# Patient Record
Sex: MALE | Race: WHITE
[De-identification: names, ages, dates, MRNs, and addresses within clinical notes are randomized per-mention and may not be internally consistent; named-entity substitution may affect disease eponyms.]

---

## 2021-04-12 ENCOUNTER — HOSPITAL ENCOUNTER (EMERGENCY)
Dept: HOSPITAL 41 - JD.ED | Age: 61
Discharge: HOME | End: 2021-04-12
Payer: MEDICARE

## 2021-04-12 DIAGNOSIS — N41.0: ICD-10-CM

## 2021-04-12 DIAGNOSIS — Z72.0: ICD-10-CM

## 2021-04-12 DIAGNOSIS — Z86.73: ICD-10-CM

## 2021-04-12 DIAGNOSIS — E11.65: Primary | ICD-10-CM

## 2021-04-12 DIAGNOSIS — N39.0: ICD-10-CM

## 2021-04-12 LAB — HBA1C BLD-MCNC: 7.6 %

## 2021-04-12 PROCEDURE — 83735 ASSAY OF MAGNESIUM: CPT

## 2021-04-12 PROCEDURE — 84484 ASSAY OF TROPONIN QUANT: CPT

## 2021-04-12 PROCEDURE — 93005 ELECTROCARDIOGRAM TRACING: CPT

## 2021-04-12 PROCEDURE — 85610 PROTHROMBIN TIME: CPT

## 2021-04-12 PROCEDURE — 87088 URINE BACTERIA CULTURE: CPT

## 2021-04-12 PROCEDURE — 80307 DRUG TEST PRSMV CHEM ANLYZR: CPT

## 2021-04-12 PROCEDURE — 87184 SC STD DISK METHOD PER PLATE: CPT

## 2021-04-12 PROCEDURE — 81001 URINALYSIS AUTO W/SCOPE: CPT

## 2021-04-12 PROCEDURE — 83605 ASSAY OF LACTIC ACID: CPT

## 2021-04-12 PROCEDURE — 83930 ASSAY OF BLOOD OSMOLALITY: CPT

## 2021-04-12 PROCEDURE — 83036 HEMOGLOBIN GLYCOSYLATED A1C: CPT

## 2021-04-12 PROCEDURE — 80306 DRUG TEST PRSMV INSTRMNT: CPT

## 2021-04-12 PROCEDURE — 86140 C-REACTIVE PROTEIN: CPT

## 2021-04-12 PROCEDURE — 99285 EMERGENCY DEPT VISIT HI MDM: CPT

## 2021-04-12 PROCEDURE — 87086 URINE CULTURE/COLONY COUNT: CPT

## 2021-04-12 PROCEDURE — 82962 GLUCOSE BLOOD TEST: CPT

## 2021-04-12 PROCEDURE — 36415 COLL VENOUS BLD VENIPUNCTURE: CPT

## 2021-04-12 PROCEDURE — 85025 COMPLETE CBC W/AUTO DIFF WBC: CPT

## 2021-04-12 PROCEDURE — 87186 SC STD MICRODIL/AGAR DIL: CPT

## 2021-04-12 PROCEDURE — 71045 X-RAY EXAM CHEST 1 VIEW: CPT

## 2021-04-12 PROCEDURE — 85730 THROMBOPLASTIN TIME PARTIAL: CPT

## 2021-04-12 PROCEDURE — 96365 THER/PROPH/DIAG IV INF INIT: CPT

## 2021-04-12 PROCEDURE — 83880 ASSAY OF NATRIURETIC PEPTIDE: CPT

## 2021-04-12 PROCEDURE — 70450 CT HEAD/BRAIN W/O DYE: CPT

## 2021-04-12 PROCEDURE — 80053 COMPREHEN METABOLIC PANEL: CPT

## 2021-04-12 NOTE — CT
Head CT

 

Technique: Multiple axial sections through the brain were obtained.  

Intravenous contrast was not utilized.  Reconstructed coronal and 

sagittal images were obtained.

 

Findings: Old infarct is seen within the left periventricular white 

matter.  This causes mild ex vacuole enlargement of the left lateral 

ventricle.  Ventricles along with basal cisterns and sulci over the 

convexities are also mildly dilated.  No other abnormal parenchymal 

densities are seen.  No evidence of intracranial hemorrhage is seen.  

No midline shift or mass-effect is seen.

 

Bone window settings were reviewed.  Visualized mastoid sinuses and 

paranasal sinuses show nothing acute.  No acute calvarial finding is 

appreciated.

 

Impression:

1.  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 

vacuole enlargement of the left lateral ventricle.

2.  Mild senescent change as noted above.

3.  No acute intracranial abnormality is appreciated.

 

Diagnostic code #2

## 2021-04-12 NOTE — EDM.PDOC
ED HPI GENERAL MEDICAL PROBLEM





- General


Chief Complaint: Neuro Symptoms/Deficits


Stated Complaint: CONFUSION


Time Seen by Provider: 04/12/21 15:00


Source of Information: Reports: Patient


History Limitations: Reports: No Limitations





- History of Present Illness


INITIAL COMMENTS - FREE TEXT/NARRATIVE: 


60-year-old male presents to the ED complaining of confusion and weakness parti

cular of his left side.  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 

hemiparesis and difficulties with his speech.  His presentation was that of 

being confused not able to identify where he gets his prescriptions what 

prescriptions he is on etc.  He states for the last week or more he is 

appreciated that if he bends over to tie his shoes he lifts towards the left 

side but he has not yet fallen.  He does have intermittent problems with 

headaches.  No nausea or vomiting.  No recent closed head injuries.  He denies 

alcohol use.  The only medication that is new to him is Metformin which is 

started 500 mg twice daily about a month ago and he is not checking blood sugars

at home.  Therefore we do cannot identify what medications he takes per se as he

gets them through the mail.  Patient was seen as a stroke alert patient through 

the ED.  He was sent to the CT suite by nursing staff.  CT head reveals an old 

infarct within the left periventricular white matter.  This causes mild ex vacuo

oral enlargement of the left lateral ventricle.  Ventricles along with the basal

cisterns and sulci over the convexities are also mildly dilated.  No other 

abnormal parenchymal densities are seen.  No evidence of intracranial hemorrhage

is seen.  No midline shift or mass-effect identified.  Bone window settings were

reviewed.  Visualized mastoid sinuses and paranasal sinuses show nothing acute. 

No acute calvarial finding is appreciated.  Of note patient quit smoking after 

his first CVA 7 years ago but has been smoking again for the last 7 months.  

Took a while to get this out of him but he is experiencing some dysuria urgency 

and frequency that started last evening.





Onset: Unknown/Unsure (Sounds like left-sided weakness and feeling off balance 

has been going on for at least a month but seems to be progressively getting 

worse.)


Duration: Week(s):, Getting Worse


Location: Reports: Generalized (Seems to be experiencing left-sided weakness  

listing to the left side at times and feels off balance.)


Quality: Reports: Other (Feeling confused and disoriented at times.)


Severity: Moderate


Improves with: Reports: None


Worsens with: Reports: None


Context: Denies: Activity, Exercise, Lifting, Sick Contact, Other


Associated Symptoms: Reports: Confusion, Cough, cough w sputum (Gross cough.), 

Headaches, Malaise, Shortness of Breath, Weakness.  Denies: No Other Symptoms, 

Chest Pain (  Occasional brown sputum production), Diaphoresis, Fever/Chills, 

Loss of Appetite, Nausea/Vomiting, Rash, Seizure, Syncope


Treatments PTA: Reports: Other (see below) (Treatment plan about a month ago 

only new medication is Metformin 500 mg twice daily introduced to his)


  ** Headache


Pain Score (Numeric/FACES): 3





- Related Data


                                    Allergies











Allergy/AdvReac Type Severity Reaction Status Date / Time


 


No Known Allergies Allergy   Verified 04/12/21 14:57











Home Meds: 


                                    Home Meds





levoFLOXacin [Levaquin] 500 mg PO DAILY #14 tab 04/12/21 [Rx]











Past Medical History


Cardiovascular History: Reports: Hypertension


Gastrointestinal History: Reports: GERD


Neurological History: Reports: CVA (Affecting the right side of his body with 

some expressive aphasia for which he is recovered from very well.  He still has 

some residual right-sided weakness and off balance symptoms at times)


Endocrine/Metabolic History: Reports: Diabetes, Type II





Social & Family History





- Tobacco Use


Tobacco Use Status *Q: Current Every Day Tobacco User


Years of Tobacco use: 30


Packs/Tins Daily: 0.5





- Recreational Drug Use


Recreational Drug Use: No





- Living Situation & Occupation


Living situation: Reports: Single, with Family


Occupation: Unemployed (Apparently lives with his mother.)





ED ROS GENERAL





- Review of Systems


Review Of Systems: See Below


Reason Not Obtained: Difficult to obtain review of systems as the patient cannot

 remem


Constitutional: Reports: Malaise, Weakness, Fatigue, Decreased Appetite.  

Denies: Fever, Chills, Weight Loss


HEENT: Reports: Glasses.  Denies: Contact Lenses, Dental Pain, Ear Discharge, 

Ear Pain, Eye Discharge, Eye Pain, Hearing Loss, Nosebleed, Nose Pain, Rhinitis,

 Sinus Problem, Throat Pain, Throat Swelling, Vertigo


Respiratory: Reports: Shortness of Breath, Wheezing, Cough, Sputum (Don cough 

in the mornings.).  Denies: Pleuritic Chest Pain, Hemoptysis


Cardiovascular: Reports: Blood Pressure Problem, Dyspnea on Exertion.  Denies: 

Chest Pain (  Sputum intermittently.), Claudication, Edema, Lightheadedness, 

Orthopnea, Palpitations


Endocrine: Reports: Fatigue


GI/Abdominal: Reports: No Symptoms


: Reports: Dysuria (Started last evening.), Frequency, Urgency, Other


Musculoskeletal: Reports: Joint Pain (Pool x2.)


Skin: Reports: No Symptoms (  Knees hips low back and neck at times)


Neurological: Reports: Confusion, Headache, Difficulty Walking, Weakness.  De

nies: Dizziness, Numbness, Paresthesia, Pre-Existing Deficit (Seems to list to 

the left side intermittently.), Syncope, Tingling, Tremors, Trouble Speaking, 

Change in Speech


Psychiatric: Reports: Anxiety


Hematologic/Lymphatic: Reports: No Symptoms


Immunologic: Reports: No Symptoms





ED EXAM, NEURO





- Physical Exam


Exam: See Below


Exam Limited By: No Limitations


General Appearance: Alert, WD/WN, Anxious, Mild Distress, Other (Temperature is 

36.4 degrees.  Face feels a little warmer than that.  Heart rate 106 and sinus 

respiratory is 26 with O2 sats of 98% room air.  He is mildly hyperventilating. 

 BP is 142/81.)


Eye Exam: Bilateral Eye: Normal Inspection (No scleral icterus or blepharal 

pallor.), PERRL


Ears: Normal TMs


Throat/Mouth: Normal Inspection, Normal Lips, Normal Teeth, Normal Oropharynx, 

Other (Tongue is mildly dry.)


Head Exam: Atraumatic, Normocephalic, Other


Neck: Normal Inspection, Supple (No outward signs of any head or facial 

trauma.), Non-Tender, Full Range of Motion.  No: Carotid Bruit, Lymphadenopathy 

(L), Lymphadenopathy (R)


Respiratory/Chest: No Respiratory Distress, Lungs Clear, No Accessory Muscle 

Use, Wheezing


Cardiovascular: Normal Peripheral Pulses, Regular Rate, Rhythm, No Edema, No 

Gallop, No Murmur, No Rub


GI/Abdominal: Normal Bowel Sounds, Soft, Non-Tender, No Organomegaly, No Mass, 

Pelvis Stable


Neurological: Alert, Normal Mood/Affect, Normal Dorsiflexion, CN II-XII Intact, 

Normal Plantar Flexion, Oriented x 3.  No: Normal Gait, Normal Reflexes


DTR: 0: Achilles (R), Achilles (L), 1+: Bicep (R), Patella (R), Patella (L), 2+:

 Bicep (L)


Back Exam: Normal Inspection, Full Range of Motion.  No: CVA Tenderness (L), CVA

 Tenderness (R)


Extremities: Normal Inspection, Normal Range of Motion, Non-Tender, No Pedal 

Edema


Psychiatric: Normal Affect, Normal Mood


Skin Exam: Dry, Intact, Normal Color, No Rash


  ** #1 Interpretation


EKG Date: 04/12/21


Time: 14:58


Rhythm: NSR


Rate (Beats/Min): 98


Axis: Normal


P-Wave: Present


QRS: Other (RSR prime wave in V2 consider normal variant)


ST-T: Normal


QT: Normal


EKG Interpretation Comments: 


Essentially normal ECG








Course





- Vital Signs


Last Recorded V/S: 


                                Last Vital Signs











Temp  36.4 C   04/12/21 14:50


 


Pulse  95   04/12/21 19:00


 


Resp  16   04/12/21 19:00


 


BP  133/70   04/12/21 19:00


 


Pulse Ox  98   04/12/21 19:00














- Orders/Labs/Meds


Labs: 


                                Laboratory Tests











  04/12/21 04/12/21 04/12/21 Range/Units





  14:53 14:57 14:57 


 


WBC   14.05 H   (4.23-9.07)  K/mm3


 


RBC   5.44   (4.63-6.08)  M/mm3


 


Hgb   16.0   (13.7-17.5)  gm/dl


 


Hct   47.8   (40.1-51.0)  %


 


MCV   87.9   (79.0-92.2)  fl


 


MCH   29.4   (25.7-32.2)  pg


 


MCHC   33.5   (32.2-35.5)  g/dl


 


RDW Std Deviation   43.5   (35.1-43.9)  fL


 


Plt Count   215   (163-337)  K/mm3


 


MPV   10.2   (9.4-12.3)  fl


 


Neut % (Auto)   74.9 H   (34.0-67.9)  %


 


Lymph % (Auto)   14.7 L   (21.8-53.1)  %


 


Mono % (Auto)   8.1   (5.3-12.2)  %


 


Eos % (Auto)   1.6   (0.8-7.0)  


 


Baso % (Auto)   0.5   (0.1-1.2)  %


 


Neut # (Auto)   10.52 H   (1.78-5.38)  K/mm3


 


Lymph # (Auto)   2.07   (1.32-3.57)  K/mm3


 


Mono # (Auto)   1.14 H   (0.30-0.82)  K/mm3


 


Eos # (Auto)   0.22   (0.04-0.54)  K/mm3


 


Baso # (Auto)   0.07   (0.01-0.08)  K/mm3


 


Manual Slide Review   Normal smear   


 


PT     (9.7-12.0)  SECONDS


 


INR     


 


APTT     (21.7-31.4)  SECONDS


 


Sodium    137  (136-145)  mEq/L


 


Potassium    3.7  (3.5-5.1)  mEq/L


 


Chloride    100  ()  mEq/L


 


Carbon Dioxide    28  (21-32)  mEq/L


 


Anion Gap    12.7  (5-15)  


 


BUN    8  (7-18)  mg/dL


 


Creatinine    1.3  (0.7-1.3)  mg/dL


 


Est Cr Clr Drug Dosing    67.98  mL/min


 


Estimated GFR (MDRD)    57  (>60)  mL/min


 


BUN/Creatinine Ratio    6.2 L  (14-18)  


 


Glucose    209 H  ()  mg/dL


 


POC Glucose  211 H    ()  mg/dL


 


Hemoglobin A1c    ( - 5.6) %


 


Serum Osmolality    288  (280-300)  mosm/kg


 


Lactic Acid     (0.4-2.0)  mmol/L


 


Calcium    8.7  (8.5-10.1)  mg/dL


 


Magnesium    1.6 L  (1.8-2.4)  mg/dl


 


Total Bilirubin    0.4  (0.2-1.0)  mg/dL


 


AST    19  (15-37)  U/L


 


ALT    43  (16-63)  U/L


 


Alkaline Phosphatase    94  ()  U/L


 


Troponin I    < 0.017  (0.00-0.056)  ng/mL


 


C-Reactive Protein    1.1 H*  (<1.0)  mg/dL


 


NT-Pro-B Natriuret Pep     (0-125)  pg/mL


 


Total Protein    7.6  (6.4-8.2)  g/dl


 


Albumin    3.8  (3.4-5.0)  g/dl


 


Globulin    3.8  gm/dL


 


Albumin/Globulin Ratio    1.0  (1-2)  


 


Urine Color     (Yellow)  


 


Urine Appearance     (Clear)  


 


Urine pH     (5.0-8.0)  


 


Ur Specific Gravity     (1.005-1.030)  


 


Urine Protein     (Negative)  


 


Urine Glucose (UA)     (Negative)  


 


Urine Ketones     (Negative)  


 


Urine Occult Blood     (Negative)  


 


Urine Nitrite     (Negative)  


 


Urine Bilirubin     (Negative)  


 


Urine Urobilinogen     (0.2-1.0)  


 


Ur Leukocyte Esterase     (Negative)  


 


Urine RBC     (0-5)  /hpf


 


Urine WBC     (0-5)  /hpf


 


Ur Squamous Epith Cells     (0-5)  /hpf


 


Urine Bacteria     (FEW)  /hpf


 


Urine Mucus     (FEW)  /hpf


 


Urine Opiates Screen     (SCQHNB=233)  


 


Ur Buprenorphine Scrn     (CUTOFF=10)  


 


Ur Oxycodone Screen     (GTM4DC=272)  


 


Urine Methadone Screen     (LGL7LB=728)  


 


Ur Propoxyphene Screen     (HUWKRJ=493)  


 


Ur Barbiturates Screen     (YZMSFA=428)  


 


Ur Tricyclics Screen     (RFTGSD=573)  


 


Ur Phencyclidine Scrn     (CUTOFF=25)  


 


Ur Amphetamine Screen     (WDPZWC=601)  


 


U Methamphetamines Scrn     (OVEJTI=709)  


 


U Benzodiazepines Scrn     (EMFLNB=722)  


 


U Cocaine Metab Screen     (DITAXC=396)  


 


U Marijuana (THC) Screen     (CUTOFF=50)  


 


Ethyl Alcohol    0.00  (0.00)  gm%














  04/12/21 04/12/21 04/12/21 Range/Units





  14:57 14:57 14:57 


 


WBC     (4.23-9.07)  K/mm3


 


RBC     (4.63-6.08)  M/mm3


 


Hgb     (13.7-17.5)  gm/dl


 


Hct     (40.1-51.0)  %


 


MCV     (79.0-92.2)  fl


 


MCH     (25.7-32.2)  pg


 


MCHC     (32.2-35.5)  g/dl


 


RDW Std Deviation     (35.1-43.9)  fL


 


Plt Count     (163-337)  K/mm3


 


MPV     (9.4-12.3)  fl


 


Neut % (Auto)     (34.0-67.9)  %


 


Lymph % (Auto)     (21.8-53.1)  %


 


Mono % (Auto)     (5.3-12.2)  %


 


Eos % (Auto)     (0.8-7.0)  


 


Baso % (Auto)     (0.1-1.2)  %


 


Neut # (Auto)     (1.78-5.38)  K/mm3


 


Lymph # (Auto)     (1.32-3.57)  K/mm3


 


Mono # (Auto)     (0.30-0.82)  K/mm3


 


Eos # (Auto)     (0.04-0.54)  K/mm3


 


Baso # (Auto)     (0.01-0.08)  K/mm3


 


Manual Slide Review     


 


PT  10.3    (9.7-12.0)  SECONDS


 


INR  0.96    


 


APTT  29.7    (21.7-31.4)  SECONDS


 


Sodium     (136-145)  mEq/L


 


Potassium     (3.5-5.1)  mEq/L


 


Chloride     ()  mEq/L


 


Carbon Dioxide     (21-32)  mEq/L


 


Anion Gap     (5-15)  


 


BUN     (7-18)  mg/dL


 


Creatinine     (0.7-1.3)  mg/dL


 


Est Cr Clr Drug Dosing     mL/min


 


Estimated GFR (MDRD)     (>60)  mL/min


 


BUN/Creatinine Ratio     (14-18)  


 


Glucose     ()  mg/dL


 


POC Glucose     ()  mg/dL


 


Hemoglobin A1c    7.6 H ( - 5.6) %


 


Serum Osmolality     (280-300)  mosm/kg


 


Lactic Acid     (0.4-2.0)  mmol/L


 


Calcium     (8.5-10.1)  mg/dL


 


Magnesium     (1.8-2.4)  mg/dl


 


Total Bilirubin     (0.2-1.0)  mg/dL


 


AST     (15-37)  U/L


 


ALT     (16-63)  U/L


 


Alkaline Phosphatase     ()  U/L


 


Troponin I     (0.00-0.056)  ng/mL


 


C-Reactive Protein     (<1.0)  mg/dL


 


NT-Pro-B Natriuret Pep   63   (0-125)  pg/mL


 


Total Protein     (6.4-8.2)  g/dl


 


Albumin     (3.4-5.0)  g/dl


 


Globulin     gm/dL


 


Albumin/Globulin Ratio     (1-2)  


 


Urine Color     (Yellow)  


 


Urine Appearance     (Clear)  


 


Urine pH     (5.0-8.0)  


 


Ur Specific Gravity     (1.005-1.030)  


 


Urine Protein     (Negative)  


 


Urine Glucose (UA)     (Negative)  


 


Urine Ketones     (Negative)  


 


Urine Occult Blood     (Negative)  


 


Urine Nitrite     (Negative)  


 


Urine Bilirubin     (Negative)  


 


Urine Urobilinogen     (0.2-1.0)  


 


Ur Leukocyte Esterase     (Negative)  


 


Urine RBC     (0-5)  /hpf


 


Urine WBC     (0-5)  /hpf


 


Ur Squamous Epith Cells     (0-5)  /hpf


 


Urine Bacteria     (FEW)  /hpf


 


Urine Mucus     (FEW)  /hpf


 


Urine Opiates Screen     (TYHLVV=887)  


 


Ur Buprenorphine Scrn     (CUTOFF=10)  


 


Ur Oxycodone Screen     (QAL5DZ=333)  


 


Urine Methadone Screen     (DXZ4ZQ=966)  


 


Ur Propoxyphene Screen     (PFYGWH=979)  


 


Ur Barbiturates Screen     (UQPZXS=562)  


 


Ur Tricyclics Screen     (PDHZRG=454)  


 


Ur Phencyclidine Scrn     (CUTOFF=25)  


 


Ur Amphetamine Screen     (GVZIAV=949)  


 


U Methamphetamines Scrn     (IVTIQS=391)  


 


U Benzodiazepines Scrn     (QSUXXX=448)  


 


U Cocaine Metab Screen     (WFPCGY=256)  


 


U Marijuana (THC) Screen     (CUTOFF=50)  


 


Ethyl Alcohol     (0.00)  gm%














  04/12/21 04/12/21 04/12/21 Range/Units





  15:05 15:05 15:23 


 


WBC     (4.23-9.07)  K/mm3


 


RBC     (4.63-6.08)  M/mm3


 


Hgb     (13.7-17.5)  gm/dl


 


Hct     (40.1-51.0)  %


 


MCV     (79.0-92.2)  fl


 


MCH     (25.7-32.2)  pg


 


MCHC     (32.2-35.5)  g/dl


 


RDW Std Deviation     (35.1-43.9)  fL


 


Plt Count     (163-337)  K/mm3


 


MPV     (9.4-12.3)  fl


 


Neut % (Auto)     (34.0-67.9)  %


 


Lymph % (Auto)     (21.8-53.1)  %


 


Mono % (Auto)     (5.3-12.2)  %


 


Eos % (Auto)     (0.8-7.0)  


 


Baso % (Auto)     (0.1-1.2)  %


 


Neut # (Auto)     (1.78-5.38)  K/mm3


 


Lymph # (Auto)     (1.32-3.57)  K/mm3


 


Mono # (Auto)     (0.30-0.82)  K/mm3


 


Eos # (Auto)     (0.04-0.54)  K/mm3


 


Baso # (Auto)     (0.01-0.08)  K/mm3


 


Manual Slide Review     


 


PT     (9.7-12.0)  SECONDS


 


INR     


 


APTT     (21.7-31.4)  SECONDS


 


Sodium     (136-145)  mEq/L


 


Potassium     (3.5-5.1)  mEq/L


 


Chloride     ()  mEq/L


 


Carbon Dioxide     (21-32)  mEq/L


 


Anion Gap     (5-15)  


 


BUN     (7-18)  mg/dL


 


Creatinine     (0.7-1.3)  mg/dL


 


Est Cr Clr Drug Dosing     mL/min


 


Estimated GFR (MDRD)     (>60)  mL/min


 


BUN/Creatinine Ratio     (14-18)  


 


Glucose     ()  mg/dL


 


POC Glucose     ()  mg/dL


 


Hemoglobin A1c    ( - 5.6) %


 


Serum Osmolality     (280-300)  mosm/kg


 


Lactic Acid    0.9  (0.4-2.0)  mmol/L


 


Calcium     (8.5-10.1)  mg/dL


 


Magnesium     (1.8-2.4)  mg/dl


 


Total Bilirubin     (0.2-1.0)  mg/dL


 


AST     (15-37)  U/L


 


ALT     (16-63)  U/L


 


Alkaline Phosphatase     ()  U/L


 


Troponin I     (0.00-0.056)  ng/mL


 


C-Reactive Protein     (<1.0)  mg/dL


 


NT-Pro-B Natriuret Pep     (0-125)  pg/mL


 


Total Protein     (6.4-8.2)  g/dl


 


Albumin     (3.4-5.0)  g/dl


 


Globulin     gm/dL


 


Albumin/Globulin Ratio     (1-2)  


 


Urine Color   Yellow   (Yellow)  


 


Urine Appearance   Clear   (Clear)  


 


Urine pH   5.5   (5.0-8.0)  


 


Ur Specific Gravity   1.025   (1.005-1.030)  


 


Urine Protein   1+ H   (Negative)  


 


Urine Glucose (UA)   2+ H   (Negative)  


 


Urine Ketones   Negative   (Negative)  


 


Urine Occult Blood   3+ H   (Negative)  


 


Urine Nitrite   Positive H   (Negative)  


 


Urine Bilirubin   Negative   (Negative)  


 


Urine Urobilinogen   0.2   (0.2-1.0)  


 


Ur Leukocyte Esterase   1+ H   (Negative)  


 


Urine RBC   20-30 H   (0-5)  /hpf


 


Urine WBC   30-40 H   (0-5)  /hpf


 


Ur Squamous Epith Cells   0-5   (0-5)  /hpf


 


Urine Bacteria   Moderate H   (FEW)  /hpf


 


Urine Mucus   Few   (FEW)  /hpf


 


Urine Opiates Screen  Negative    (HBGIOC=563)  


 


Ur Buprenorphine Scrn  Negative    (CUTOFF=10)  


 


Ur Oxycodone Screen  Negative    (NGJ3YR=688)  


 


Urine Methadone Screen  Negative    (NAG1RA=621)  


 


Ur Propoxyphene Screen  Negative    (WDXDDV=140)  


 


Ur Barbiturates Screen  Negative    (JRRKXQ=276)  


 


Ur Tricyclics Screen  Negative    (DJZYVV=156)  


 


Ur Phencyclidine Scrn  Negative    (CUTOFF=25)  


 


Ur Amphetamine Screen  Negative    (XAFCTW=000)  


 


U Methamphetamines Scrn  Negative    (UZZTQR=797)  


 


U Benzodiazepines Scrn  Negative    (PTUKJG=911)  


 


U Cocaine Metab Screen  Negative    (VKHTYQ=955)  


 


U Marijuana (THC) Screen  Negative    (CUTOFF=50)  


 


Ethyl Alcohol     (0.00)  gm%











Meds: 


Medications














Discontinued Medications














Generic Name Dose Route Start Last Admin





  Trade Name Freq  PRN Reason Stop Dose Admin


 


Sodium Chloride  1,000 mls @ 150 mls/hr  04/12/21 15:15  04/12/21 15:19





  Normal Saline  IV   150 mls/hr





  ASDIRECTED JOSE   Administration


 


Ceftriaxone Sodium 2 gm/  100 mls @ 200 mls/hr  04/12/21 17:33  04/12/21 17:41





  Sodium Chloride  IV  04/12/21 18:02  200 mls/hr





  ONETIME ONE   Administration


 


Ibuprofen  600 mg  04/12/21 17:33  04/12/21 17:41





  Ibuprofen 600 Mg Tab  PO  04/12/21 17:34  600 mg





  ONETIME ONE   Administration














- Radiology Interpretation


Free Text/Narrative:: 


60-year-old male presents to the ED for evaluation of possible recurrent stroke.

  He has had a CVA in the past approximately 7 years ago which created right-si

ded hemiparesis and weakness and impaired speech for a period of time.  He still

 feels he is a little weak on the right side but for the most part is recovered.

  Or lately he feels that he is listing towards the left side and feels off 

kilter when he walks.  He particularly notes this when he tries to tie his shoes

 if he bends over he will often end up against the wall towards the left side.  

Intermittent headaches.  No nausea vomiting no visual acuity changes.  He cannot

 name any of his medications although he is on a blood pressure pill and was 

recently started on Metformin 500 mg twice daily for diabetes.  He does not 

check his blood sugars.  They never told him that he needed to do so.  Neuro 

exam is otherwise grossly normal his Romberg was positive.  He lists towards the

 right side.  ITD detect very subtle weakness on the right side but not on the 

left.  This is on handgrip and flexion at the elbow.  Finger-to-nose assessment 

was normal as was rapid alternating movements.  No obvious ataxia identified on 

exam plan repeat CT scan of the head.  Chest x-ray routine labs to be done.








- Re-Assessments/Exams


Free Text/Narrative Re-Assessment/Exam: 





04/12/21 16:27 CT head has been completed.  Old infarct is seen within the left 

periventricular white matter.  This causes mild ex vacuo enlargement of the left

 lateral ventricle.  Ventricles along with basal cisterns and sulci over the 

convexities are also mildly prominent.  No other abnormal parenchymal densities 

are noted.  No evidence of intracranial hemorrhage is seen.  No midline shift or

 mass-effect is seen.  Bone window settings were reviewed.  Visualized mastoid 

sinuses and paranasal sinuses showed nothing acute.  No acute calvarial finding 

is appreciated.  Chest x-ray shows heart size and mediastinum to be within 

normal limits.  Lungs are clear with no acute parenchymal changes.  No acute 

osseous findings are identified.





04/12/21 16:53 White count is elevated at 14.05 with 75% neutrophils on the auto

 differential.  Hemoglobin is 16.0 with hematocrit of 47.8.  Platelet count 

normal at 215,000 PT is 10.3 with an INR of 0.96 PTT is 29.7.  Sodium 137 

potassium of 3.7.  Chloride is 100 with a bicarb of 28.  Anion gap is 12.7.  BUN

 is 8 with a creatinine of 1.3.  GFR is 57.  Glucose is elevated at 209 at the 

bedside it was 211.  Hemoglobin A1c is 7.6 slightly elevated.  We aim for a 

glycosylated protein of 7.1.  Serum osmolality is low normal at 288.  Lactic 

acid 0.9.  Calcium is 8.7 magnesium slightly low at 1.6.  Liver function normal 

troponin I is less than 0.017.  C-reactive protein is 1.1.  BNP is 63 with a 

total protein is 7.6 albumin fraction of 3.8.  Urinalysis is yellow it shows 1+ 

proteinuria 2+ glucosuria and 3+ occult blood.  It is nitrite positive due to 

the blood in the urine.  There is 1+ leukocyte esterase.  Urine drug screen is 

completely normal.  Blood alcohol is 0.00.





04/12/21 17:34 patient is complaining of a headache.  He will be given Motrin 

600 mg p.o.  He is finding it very cold in his room although I did not find it 

overly chilly.  He will be given a warm blanket to keep him warm.  He is 

requesting some water which we did provide.  The urinalysis is showing positive 

leukocyte esterase suggestive of a urinary tract infection most likely from his 

prostate.  I suspect this is what is making him feel ill and throwing him is 

balance off due to generally not feeling well  I will give him Rocephin 2 g IV 

at this time.





04/12/21 18:08 The micro on the urine is now available and shows 20-30 RBCs per 

hour per field and 30-40 white blood cells per high-power field with moderate 

bacteria.  Urine culture has been ordered.





04/12/21 18:57 Patient feels much better at this point time.  He states even the

 dysuria is not as bad.  Plan will be to discharge him home on Levaquin 500 mg 

once daily for the next 14 days due to suspect prostatitis.  The fact that he 

has developed diabetes over the last couple of years is also contributing to 

prostatitis and urinary tract infection.  He would be well advised to have a 

urinalysis completed a week after he finishes his antibiotics.  He will continue

 Motrin 600 mg every 6 hours as needed for fever relief and dysuria.














Departure





- Departure


Time of Disposition: 18:59


Disposition: Home, Self-Care 01


Condition: Fair


Clinical Impression: 


 Upper urinary tract infection





Prostatitis


Qualifiers:


 Prostatitis type: acute Qualified Code(s): N41.0 - Acute prostatitis





Type 2 diabetes mellitus


Qualifiers:


 Diabetes mellitus long term insulin use: without long term use Diabetes 

mellitus complication status: with hyperglycemia Qualified Code(s): E11.65 - 

Type 2 diabetes mellitus with hyperglycemia








- Discharge Information


*PRESCRIPTION DRUG MONITORING PROGRAM REVIEWED*: Not Applicable


*COPY OF PRESCRIPTION DRUG MONITORING REPORT IN PATIENT COLLETTE: Not Applicable


Prescriptions: 


levoFLOXacin [Levaquin] 500 mg PO DAILY #14 tab


Instructions:  Type 2 Diabetes Mellitus, Diagnosis, Adult, Prostatitis


Referrals: 


PCP,None [Primary Care Provider] - 


Forms:  ED Department Discharge


Additional Instructions: 


Evaluation in the emergency room today in regards to generally not feeling well 

most of the day.  You appreciated having some painful urination which we called 

dysuria as well as urinary frequency and urge to go since last evening but 

progressively got worse today.  You had a very low-grade fever upon arrival in 

the ED.  Your chief complaint was confusion as you felt ill.  You felt off 

balance and offkilter and had some concerns you may have had a recurrent stroke.

 Repeat CT scan of the brain does not reveal any new stroke.  Lab test revealed 

a mildly elevated white blood cell count compatible with an underlying 

infection.  The urinalysis proved that it was coming from the urine and the 

prostate gland causing your current illness and infection.  You were treated in 

the emergency room with IV fluids and IV antibiotic Rocephin 2 g to start to 

bring the infection under control.  At the time of discharge he felt improved.  

Continue Motrin 600 mg every 6 hours as needed for fever relief body ache relief

and headache relief as needed for the next day or 2 until the antibiotics become

effective.  You will need to take antibiotic Levaquin 500 mg once daily for 

another 14 days starting tomorrow at suppertime.  Return to the emergency room 

if you develop severe chills or shakes or high fever greater than 101.5 degrees.

 Also if you develop any nausea and vomiting and unable to keep down medication.

 Blood sugars were 211 in the emergency room today able to go down once your 

current illness is under control.  Suggest follow-up with your personal care 

physician or primary care provider about a week after you are finished 

antibiotics to recheck the urine to make sure that action has been completely 

eradicated.





Sepsis Event Note (ED)





- Evaluation


Sepsis Screening Result: No Definite Risk

## 2021-04-12 NOTE — CR
Chest: Portable view of the chest was obtained.

 

Comparison: No prior chest imaging is available.

 

Heart size and mediastinum are normal.  Lungs are clear with no acute 

parenchymal change.  No acute osseous finding is seen.

 

Impression:

1.  Nothing acute is seen on portable chest x-ray.

 

Diagnostic code #1

## 2021-07-11 ENCOUNTER — HOSPITAL ENCOUNTER (EMERGENCY)
Dept: HOSPITAL 41 - JD.ED | Age: 61
LOS: 1 days | Discharge: HOME | End: 2021-07-12
Payer: MEDICARE

## 2021-07-11 DIAGNOSIS — Z79.01: ICD-10-CM

## 2021-07-11 DIAGNOSIS — E11.9: ICD-10-CM

## 2021-07-11 DIAGNOSIS — Z79.899: ICD-10-CM

## 2021-07-11 DIAGNOSIS — Z79.84: ICD-10-CM

## 2021-07-11 DIAGNOSIS — Z72.0: ICD-10-CM

## 2021-07-11 DIAGNOSIS — I10: ICD-10-CM

## 2021-07-11 DIAGNOSIS — I26.99: Primary | ICD-10-CM

## 2021-07-11 PROCEDURE — 93005 ELECTROCARDIOGRAM TRACING: CPT

## 2021-07-11 PROCEDURE — 71045 X-RAY EXAM CHEST 1 VIEW: CPT

## 2021-07-11 PROCEDURE — 36415 COLL VENOUS BLD VENIPUNCTURE: CPT

## 2021-07-11 PROCEDURE — 99285 EMERGENCY DEPT VISIT HI MDM: CPT

## 2021-07-11 PROCEDURE — 85025 COMPLETE CBC W/AUTO DIFF WBC: CPT

## 2021-07-11 PROCEDURE — 71275 CT ANGIOGRAPHY CHEST: CPT

## 2021-07-11 PROCEDURE — 80053 COMPREHEN METABOLIC PANEL: CPT

## 2021-07-11 PROCEDURE — 80307 DRUG TEST PRSMV CHEM ANLYZR: CPT

## 2021-07-11 PROCEDURE — 85379 FIBRIN DEGRADATION QUANT: CPT

## 2021-07-11 PROCEDURE — 84484 ASSAY OF TROPONIN QUANT: CPT

## 2021-07-12 NOTE — EDM.PDOC
ED HPI GENERAL MEDICAL PROBLEM





- General


Chief Complaint: Chest Pain


Stated Complaint: SHORTNESS OF BREATH


Time Seen by Provider: 07/11/21 23:31


Source of Information: Reports: Patient, RN Notes Reviewed





- History of Present Illness


INITIAL COMMENTS - FREE TEXT/NARRATIVE: 





61 yr old male with onset of L upper abd and L chest pain a short time ago, now 

somewhat better but discomfort still present L chest. This all started about 3 

hrs PTA.  Does not radiate to either arm.  He does not currently feel short of 

breath.  Pain very mildly worse with deep breathing. Hx of a stroke many yrs 

ago.  No known hx of heart disease.  No cough, fever or chills.  


  ** Mid-Sternal Chest


Pain Score (Numeric/FACES): 8





  ** Left Chest


Pain Score (Numeric/FACES): 8





- Related Data


                                    Allergies











Allergy/AdvReac Type Severity Reaction Status Date / Time


 


No Known Allergies Allergy   Verified 04/12/21 14:57











Home Meds: 


                                    Home Meds





lisinopriL [Lisinopril] 20 mg PO DAILY 07/11/21 [History]


Rivaroxaban [Xarelto] 15 mg PO BID #42 tab 07/12/21 [Rx]


metFORMIN [Glucophage XR] 500 mg PO BID 07/12/21 [History]











Past Medical History


Cardiovascular History: Reports: Hypertension


Respiratory History: Reports: Other (See Below)


Other Respiratory History: pleuresy


Gastrointestinal History: Reports: GERD


Neurological History: Reports: CVA


Endocrine/Metabolic History: Reports: Diabetes, Type II





Social & Family History





- Family History


Family Medical History: No Pertinent Family History





- Tobacco Use


Tobacco Use Status *Q: Current Every Day Tobacco User


Years of Tobacco use: 30


Packs/Tins Daily: 1


Used Tobacco, but Quit: No


Second Hand Smoke Exposure: No





- Caffeine Use


Caffeine Use: Reports: Coffee





- Recreational Drug Use


Recreational Drug Use: No





- Living Situation & Occupation


Living situation: Reports: Single, with Family


Occupation: Unemployed (Apparently lives with his mother.)





ED ROS GENERAL





- Review of Systems


Review Of Systems: See Below


Constitutional: Denies: Fever, Chills, Diaphoresis


HEENT: Reports: No Symptoms


Respiratory: Denies: Shortness of Breath, Cough


Cardiovascular: Reports: Chest Pain


GI/Abdominal: Reports: Abdominal Pain (gone)


Musculoskeletal: Denies: Shoulder Pain, Arm Pain, Back Pain


Skin: Reports: No Symptoms


Neurological: Reports: No Symptoms





ED EXAM, NEURO





- Physical Exam


Exam: See Below


General Appearance: Alert, No Apparent Distress


Throat/Mouth: Normal Inspection


Head Exam: Atraumatic


Neck: Supple


Respiratory/Chest: No Respiratory Distress, Lungs Clear, Normal Breath Sounds.  

No: Rales, Rhonchi, Wheezing


Cardiovascular: Regular Rate, Rhythm


GI/Abdominal: Non-Tender


Neurological: Alert, No Motor/Sensory Deficits


Extremities: Other (R leg very mildly more swollen than the left).  No: Leg 

Pain, Increased Warmth, Redness


  ** #1 Interpretation


EKG Date: 07/12/21


Rhythm: NSR


Rate (Beats/Min): 79


Axis: Normal


P-Wave: Present


QRS: Normal


ST-T: Normal


QT: Normal





Course





- Vital Signs


Last Recorded V/S: 


                                Last Vital Signs











Temp  98.5 F   07/11/21 23:47


 


Pulse  88   07/11/21 23:47


 


Resp  24 H  07/11/21 23:47


 


BP  134/79   07/11/21 23:47


 


Pulse Ox  94 L  07/11/21 23:47














- Orders/Labs/Meds


Orders: 


                               Active Orders 24 hr











 Category Date Time Status


 


 Chest 1V Frontal [CR] Stat Exams  07/11/21 23:38 Taken


 


 Chest PE [Ang Chest] [CT] Stat Exams  07/12/21 00:29 Taken


 


 Peripheral IV Insertion Adult [OM.PC] Stat Oth  07/11/21 23:39 Ordered











Labs: 


                                Laboratory Tests











  07/11/21 07/11/21 07/11/21 Range/Units





  23:49 23:49 23:49 


 


WBC  10.96 H    (4.23-9.07)  K/mm3


 


RBC  5.38    (4.63-6.08)  M/mm3


 


Hgb  15.6    (13.7-17.5)  gm/dl


 


Hct  46.1    (40.1-51.0)  %


 


MCV  85.7    (79.0-92.2)  fl


 


MCH  29.0    (25.7-32.2)  pg


 


MCHC  33.8    (32.2-35.5)  g/dl


 


RDW Std Deviation  46.1 H    (35.1-43.9)  fL


 


Plt Count  230    (163-337)  K/mm3


 


MPV  9.6    (9.4-12.3)  fl


 


Neut % (Auto)  65.0    (34.0-67.9)  %


 


Lymph % (Auto)  20.5 L    (21.8-53.1)  %


 


Mono % (Auto)  11.4    (5.3-12.2)  %


 


Eos % (Auto)  2.2    (0.8-7.0)  


 


Baso % (Auto)  0.6    (0.1-1.2)  %


 


Neut # (Auto)  7.12 H    (1.78-5.38)  K/mm3


 


Lymph # (Auto)  2.25    (1.32-3.57)  K/mm3


 


Mono # (Auto)  1.25 H    (0.30-0.82)  K/mm3


 


Eos # (Auto)  0.24    (0.04-0.54)  K/mm3


 


Baso # (Auto)  0.07    (0.01-0.08)  K/mm3


 


Manual Slide Review  Normal smear    


 


D-Dimer, Quantitative    2.65 H  (0.19-0.50)  mg/L


 


Sodium   139   (136-145)  mEq/L


 


Potassium   3.8   (3.5-5.1)  mEq/L


 


Chloride   103   ()  mEq/L


 


Carbon Dioxide   23   (21-32)  mEq/L


 


Anion Gap   16.8 H   (5-15)  


 


BUN   7   (7-18)  mg/dL


 


Creatinine   1.2   (0.7-1.3)  mg/dL


 


Est Cr Clr Drug Dosing   74.91   mL/min


 


Estimated GFR (MDRD)   > 60   (>60)  mL/min


 


BUN/Creatinine Ratio   5.8 L   (14-18)  


 


Glucose   195 H   (70-99)  mg/dL


 


Calcium   8.6   (8.5-10.1)  mg/dL


 


Total Bilirubin   0.4   (0.2-1.0)  mg/dL


 


AST   20   (15-37)  U/L


 


ALT   43   (16-63)  U/L


 


Alkaline Phosphatase   98   ()  U/L


 


Troponin I   < 0.017   (0.00-0.056)  ng/mL


 


Total Protein   7.1   (6.4-8.2)  g/dl


 


Albumin   3.5   (3.4-5.0)  g/dl


 


Globulin   3.6   gm/dL


 


Albumin/Globulin Ratio   1.0   (1-2)  


 


Ethyl Alcohol   0.00   (0.00)  gm%











Meds: 


Medications














Discontinued Medications














Generic Name Dose Route Start Last Admin





  Trade Name Freq  PRN Reason Stop Dose Admin


 


Sodium Chloride  1,000 mls @ 999 mls/hr  07/12/21 00:30 





  Normal Saline  IV  





  ONETIME JOSE  


 


Sodium Chloride  100 mls @ 60 drops/min  07/12/21 00:45  07/12/21 01:23





  Normal Saline  IV   60 drops/min





  ASDIRECTED JOSE   Administration


 


Iopamidol  100 ml  07/12/21 00:42  07/12/21 01:23





  Iopamidol 755 Mg/Ml 100 Ml Bottle  IVPUSH  07/12/21 00:43  100 ml





  ONETIME ONE   Administration


 


Rivaroxaban  15 mg  07/12/21 02:38 





  Rivaroxaban 15 Mg Tab  PO  07/12/21 02:39 





  ONETIME ONE  


 


Sodium Chloride  10 ml  07/11/21 23:38  07/12/21 00:03





  Sodium Chloride 0.9% 10 Ml Syringe  FLUSH   10 ml





  ASDIRECTED PRN   Administration





  Keep Vein Open  


 


Sodium Chloride  10 ml  07/12/21 00:45  07/12/21 01:24





  Sodium Chloride 0.9% 10 Ml Syringe  FLUSH   10 ml





  BOLUS JOSE   Administration














- Re-Assessments/Exams


Free Text/Narrative Re-Assessment/Exam: 





07/12/21 03:56


D Dimer did come back elevated at 2.5.  CXR nl.  Trop, other labs normal.  CT 

Angio was done that did reveal  what was described by Radiologist a filling 

defect in one pulmonary artery branch L lower lobe.  


This finding was discussed at length with patient including various options for 

treatment.  He has had no recent surgery.  He states he did just complete a road

 trip out to [place] and back just over a week ago. 


At time of recheck about an hour ago he was feeling better, chest pain 

completely gone.  He continued to not be short of breath. Sats 97 to 98 % at 

time of my reexam. Heart rate in the 70's and 80's.  I have offered possibility 

of hospital admission but also explained that he is a patient that will be 

expected to do well at home starting anticoagulation therapy at this time.   He 

would like to do out patient xarelto if not cost prohibitive.  15 mg xarelto 

ordered. Prescription to continue 15 mg bid written.


I was informed shortly after this was all done that patient had pulled his IV 

and left without talking to any of our staff.  One of our nurses called him but 

he did not answer. 


I than called him at about 03:30 and he stated he would come take the initial 

dose of xarelto and  his prescription. 


I have checked with staff and he did come get the xarelto,  his 

prescription to be filled this morning and get appropriately signed out. 





Departure





- Departure


Time of Disposition: 02:42


Disposition: Home, Self-Care 01


Condition: Fair


Clinical Impression: 


Pulmonary embolism


Qualifiers:


 Chronicity: acute Acute cor pulmonale presence: without acute cor pulmonale 








- Discharge Information


Prescriptions: 


Rivaroxaban [Xarelto] 15 mg PO BID #42 tab


Instructions:  Pulmonary Embolism


Referrals: 


PCP,None [Primary Care Provider] - 


Forms:  ED Department Discharge


Additional Instructions: 


Xarelto 15 mg twice daily for 21 days.  Because your first dose has been given 

at around 2:30 AM today it will be best to take 3 doses.  Take your next dose at

around 11 AM today and the evening dose around 10 PM. 


See one of our [facility] physicians in about 2 ot 3 days for recheck, call [phone] 

for appointment.  Return to ED at any time for severe chest pain, dizziness or 

difficulty breathing or otherwise as needed. Drink plenty of water to maintain 

hydration.   





Sepsis Event Note (ED)





- Evaluation


Sepsis Screening Result: No Definite Risk





- Focused Exam


Vital Signs: 


                                   Vital Signs











  Temp Pulse Resp BP Pulse Ox


 


 07/11/21 23:47  98.5 F  88  24 H  134/79  94 L














- My Orders


Last 24 Hours: 


My Active Orders





07/11/21 23:38


Chest 1V Frontal [CR] Stat 





07/11/21 23:39


Peripheral IV Insertion Adult [OM.PC] Stat 





07/12/21 00:29


Chest PE [Ang Chest] [CT] Stat 














- Assessment/Plan


Last 24 Hours: 


My Active Orders





07/11/21 23:38


Chest 1V Frontal [CR] Stat 





07/11/21 23:39


Peripheral IV Insertion Adult [OM.PC] Stat 





07/12/21 00:29


Chest PE [Ang Chest] [CT] Stat

## 2021-07-12 NOTE — CR
Chest: Portable view of the chest was obtained.

 

Comparison: Prior chest x-ray of 04/12/21.

 

Slight density is noted within the left lung base.  This is most 

likely due to an area of atelectasis or small area of pneumonia.  

 

Lungs otherwise are clear.  Heart size and need mediastinum are within

 normal limits.  Bony structures are without acute abnormality.

 

Impression:

1.  Area of atelectasis or small area of pneumonia with nothing acute 

otherwise seen on portable chest x-ray.

 

Diagnostic code #3

## 2021-07-12 NOTE — CT
CT chest

 

Technique: Multiple axial sections through the chest were obtained.  

Intravenous contrast was utilized.  Study has been performed as a 

pulmonary angiogram protocol.

 

Comparison: No prior chest CT study, previous chest x-ray performed 

earlier on the same day is available.

 

Findings: Filling defect is seen within the subsegmental branch of the

 left lower lung compatible with pulmonary embolism.

 

Minimal left-sided pleural effusion is seen.  No other pulmonary 

emboli are seen.

 

Slightly prominent lymph node is noted within the left hilar region 

most likely inflammatory in etiology and measuring 2.0 cm.

 

Thoracic aorta shows no aneurysm.  No mediastinal adenopathy is seen. 

 No pericardial thickening is seen.  Slight fatty infiltration is 

noted within the liver.  Other visualized upper abdominal structures 

show nothing acute.

 

Patchy area of increased density is seen within the left lung base.  

Given the small amount of pulmonary embolism this is most likely due 

to atelectasis.  Minimal findings are seen within the lingula.  Lungs 

otherwise are clear.

 

Impression:

1.  Mild amount of subsegmental pulmonary emboli within the left lower

 lung.

2.  Minimal pleural effusion within the left lung base.  Patchy areas 

of increased density within the left lung base as well as lingula.  

Given the pulmonary embolism, this is most likely due to atelectasis.

3.  Fatty infiltration within the liver.

4.  No other acute abnormality is appreciated.

 

Diagnostic code #5

 

I agree with preliminary report from [facility], finalized on 07/12/21, 2:57

 AM CDT, code 1

## 2021-07-13 ENCOUNTER — HOSPITAL ENCOUNTER (EMERGENCY)
Dept: HOSPITAL 41 - JD.ED | Age: 61
Discharge: HOME | End: 2021-07-13
Payer: MEDICARE

## 2021-07-13 DIAGNOSIS — Z79.84: ICD-10-CM

## 2021-07-13 DIAGNOSIS — Z79.899: ICD-10-CM

## 2021-07-13 DIAGNOSIS — I26.99: Primary | ICD-10-CM

## 2021-07-13 DIAGNOSIS — Z79.01: ICD-10-CM

## 2021-07-13 DIAGNOSIS — Z72.0: ICD-10-CM

## 2021-07-13 DIAGNOSIS — E11.9: ICD-10-CM

## 2021-07-13 DIAGNOSIS — I10: ICD-10-CM

## 2021-07-13 NOTE — EDM.PDOC
ED HPI GENERAL MEDICAL PROBLEM





- General


Chief Complaint: Chest Pain


Stated Complaint: SOB AND LT SIDE PAIN NOT GETTING BETTER


Time Seen by Provider: 07/13/21 17:26


Source of Information: Reports: Patient, RN Notes Reviewed


History Limitations: Reports: No Limitations





- History of Present Illness


INITIAL COMMENTS - FREE TEXT/NARRATIVE: 





Patient is a 61-year-old male who presents to the ER for ongoing left-sided 

chest/side pain, shortness of breath and needing a different prescription for 

anticoagulants due to his recent PE.  The patient was diagnosed with a pulmonary

embolus in this ER roughly 2 nights ago, given a prescription for Xarelto and 

when he went to fill it, he notes that it was too expensive so he was not able 

to fill the medication.  States he has been using aspirin at home, for a bridge 

however he is still having some pleuritic chest pain, and some associated 

shortness of breath.  Patient denies any other sick-like symptoms, fever/chills,

cough/shortness of breath, nausea/vomiting/diarrhea. He did try to go to the 

walk-in clinic for ongoing management to see if he could get the prescription 

for Coumadin but they deferred him here to the ER.


  ** Left Thoracic


Pain Score (Numeric/FACES): 5





- Related Data


                                    Allergies











Allergy/AdvReac Type Severity Reaction Status Date / Time


 


No Known Allergies Allergy   Verified 07/13/21 17:20











Home Meds: 


                                    Home Meds





lisinopriL [Lisinopril] 20 mg PO DAILY 07/11/21 [History]


Rivaroxaban [Xarelto] 15 mg PO BID #42 tab 07/12/21 [Rx]


metFORMIN [Glucophage XR] 500 mg PO BID 07/12/21 [History]


Enoxaparin [Lovenox] 100 mg SQ BID 7 Days #14 ml 07/13/21 [Rx]


Warfarin [Coumadin] 5 mg PO DAILY #30 tablet 07/13/21 [Rx]











Past Medical History


Cardiovascular History: Reports: Hypertension


Respiratory History: Reports: PE, Other (See Below)


Other Respiratory History: pleuresy


Gastrointestinal History: Reports: GERD


Neurological History: Reports: CVA


Endocrine/Metabolic History: Reports: Diabetes, Type II





Social & Family History





- Family History


Family Medical History: No Pertinent Family History





- Tobacco Use


Tobacco Use Status *Q: Current Every Day Tobacco User


Years of Tobacco use: 10


Packs/Tins Daily: 0.5





- Caffeine Use


Caffeine Use: Reports: None





- Recreational Drug Use


Recreational Drug Use: No





- Living Situation & Occupation


Living situation: Reports: Single, with Family


Occupation: Unemployed (Apparently lives with his mother.)





ED ROS GENERAL





- Review of Systems


Review Of Systems: Comprehensive ROS is negative, except as noted in HPI.





ED EXAM, GENERAL





- Physical Exam


Exam: See Below


Exam Limited By: No Limitations


General Appearance: Alert, WD/WN, No Apparent Distress


Respiratory/Chest: No Respiratory Distress, Lungs Clear, Normal Breath Sounds, 

No Accessory Muscle Use, Chest Non-Tender


Cardiovascular: Normal Peripheral Pulses, Regular Rate, Rhythm, No Edema


Peripheral Pulses: 2+: Radial (L), Radial (R)


Extremities: Normal Inspection, Normal Capillary Refill


Neurological: Alert, Oriented, Normal Cognition, No Motor/Sensory Deficits


Psychiatric: Normal Affect, Normal Mood


Skin Exam: Warm, Dry, Intact, Normal Color, No Rash





Course





- Vital Signs


Last Recorded V/S: 





                                Last Vital Signs











Temp  96.9 F   07/13/21 17:17


 


Pulse  95   07/13/21 17:17


 


Resp  16   07/13/21 17:17


 


BP  119/74   07/13/21 17:17


 


Pulse Ox  95   07/13/21 17:17














- Re-Assessments/Exams


Free Text/Narrative Re-Assessment/Exam: 





07/13/21 18:27


Patient presents to the ER for the evaluation of his pleuritic chest pain, and 

need to be on warfarin/Lovenox.  I did spend quite a bit of time on the phone 

dealing with Garrison vincent, to see if he could get this prescription a little 

bit cheaper, and I did enroll him for a savings plan program however this will 

take some time to fill so I did order the Coumadin and Lovenox for body weight 

dosing, and have sent to the [place] pharmacy and [facility] for filling.





Departure





- Departure


Time of Disposition: 18:20


Disposition: Home, Self-Care 01


Condition: Good


Clinical Impression: 


 Pleuritic chest pain





Pulmonary embolus


Qualifiers:


 Pulmonary embolism type: other Chronicity: acute Acute cor pulmonale presence: 

unspecified Qualified Code(s): I26.99 - Other pulmonary embolism without acute 

cor pulmonale





Prescriptions: 


Warfarin [Coumadin] 5 mg PO DAILY #30 tablet


Enoxaparin [Lovenox] 100 mg SQ BID 7 Days #14 ml


Referrals: 


Dada Barker MD [Ordering Only Provider] - 1 Week (f/u for 

anticoagulation d/t recent PE, and xarelto pricing; pt has been signed up for 

[employer] for insurance purposes to help with cost - did not have a PCP, 

needed to have one selected.)


Additional Instructions: 


You were evaluated in this ER today for your ongoing management of your 

pulmonary embolus.





I did spend quite a bit of time on the phone with the folks that produce 

Xarelto, and you have been enrolled in a discount program called [employer] 

however this may take some time to get the medication to you, you will be called

for verification of this.





If you do not hear anything regarding this program within the next 3 days, there

were 2 numbers that were given to me free to call, 1 [phone], or 

[phone] for ongoing prescription med help.  You may only have to pay 

roughly $85 for 30-day supply if you qualify for these programs





For today's purposes a prescription for warfarin and Lovenox has been provided 

on your behalf to the [place] pharmacy located in the [employer] store, 

however the Lovenox needed may be somewhat expensive as well due to your 

Medicare health plan.  Apparently you have a fairly high deductible health plan 

from what I am formulating. I did try to give you the least amount of 

prescription possible to help defer your out of pocket cost.





Please fill the prescription for the Coumadin at least, so you are getting some 

sort anticoagulation until this Xarelto issue can be taken care of.





For today's purposes, a primary care provider had to be selected for you and Dr. Barker was selected.  You should call [phone], and obtain an appoint 

with him within the next week for ongoing management, so he can take care of 

your general health and refill prescriptions for you.





Do not hesitate to return to the ER at any time if your symptoms change or 

worsen.








Sepsis Event Note (ED)





- Evaluation


Sepsis Screening Result: No Definite Risk





- Focused Exam


Vital Signs: 





                                   Vital Signs











  Temp Pulse Resp BP Pulse Ox


 


 07/13/21 17:17  96.9 F  95  16  119/74  95

## 2022-09-16 ENCOUNTER — HOSPITAL ENCOUNTER (EMERGENCY)
Dept: HOSPITAL 41 - JD.ED | Age: 62
Discharge: LEFT BEFORE DISCHARGE | End: 2022-09-16
Payer: MEDICARE

## 2022-09-16 DIAGNOSIS — J20.8: Primary | ICD-10-CM

## 2022-09-16 DIAGNOSIS — N40.1: ICD-10-CM

## 2022-09-16 DIAGNOSIS — Z79.84: ICD-10-CM

## 2022-09-16 DIAGNOSIS — E11.9: ICD-10-CM

## 2022-09-16 DIAGNOSIS — Z88.8: ICD-10-CM

## 2022-09-16 DIAGNOSIS — Z79.01: ICD-10-CM

## 2022-09-16 DIAGNOSIS — I10: ICD-10-CM

## 2022-09-16 DIAGNOSIS — E87.2: ICD-10-CM

## 2022-09-16 DIAGNOSIS — F17.210: ICD-10-CM

## 2022-09-16 DIAGNOSIS — R79.1: ICD-10-CM

## 2022-09-16 LAB
EGFRCR SERPLBLD CKD-EPI 2021: 69 ML/MIN (ref 60–?)
HBA1C BLD-MCNC: 8.1 %

## 2022-09-16 PROCEDURE — 96361 HYDRATE IV INFUSION ADD-ON: CPT

## 2022-09-16 PROCEDURE — 87040 BLOOD CULTURE FOR BACTERIA: CPT

## 2022-09-16 PROCEDURE — 82553 CREATINE MB FRACTION: CPT

## 2022-09-16 PROCEDURE — 51798 US URINE CAPACITY MEASURE: CPT

## 2022-09-16 PROCEDURE — 71275 CT ANGIOGRAPHY CHEST: CPT

## 2022-09-16 PROCEDURE — 85379 FIBRIN DEGRADATION QUANT: CPT

## 2022-09-16 PROCEDURE — 96374 THER/PROPH/DIAG INJ IV PUSH: CPT

## 2022-09-16 PROCEDURE — 36415 COLL VENOUS BLD VENIPUNCTURE: CPT

## 2022-09-16 PROCEDURE — 85610 PROTHROMBIN TIME: CPT

## 2022-09-16 PROCEDURE — 83880 ASSAY OF NATRIURETIC PEPTIDE: CPT

## 2022-09-16 PROCEDURE — 86140 C-REACTIVE PROTEIN: CPT

## 2022-09-16 PROCEDURE — 83735 ASSAY OF MAGNESIUM: CPT

## 2022-09-16 PROCEDURE — 81001 URINALYSIS AUTO W/SCOPE: CPT

## 2022-09-16 PROCEDURE — 99284 EMERGENCY DEPT VISIT MOD MDM: CPT

## 2022-09-16 PROCEDURE — 71045 X-RAY EXAM CHEST 1 VIEW: CPT

## 2022-09-16 PROCEDURE — G0103 PSA SCREENING: HCPCS

## 2022-09-16 PROCEDURE — 83036 HEMOGLOBIN GLYCOSYLATED A1C: CPT

## 2022-09-16 PROCEDURE — 85025 COMPLETE CBC W/AUTO DIFF WBC: CPT

## 2022-09-16 PROCEDURE — 83605 ASSAY OF LACTIC ACID: CPT

## 2022-09-16 PROCEDURE — 80053 COMPREHEN METABOLIC PANEL: CPT

## 2022-09-16 PROCEDURE — 83690 ASSAY OF LIPASE: CPT

## 2022-09-16 PROCEDURE — 85730 THROMBOPLASTIN TIME PARTIAL: CPT
